# Patient Record
(demographics unavailable — no encounter records)

---

## 2025-04-24 NOTE — SOCIAL HISTORY
[TextEntry] : The patient is . They have one son and one daughter. He quit smoking at age 50. Prior to this, he smoked  pack daily from the age of 16. He denies any alcohol or drug use. He is retired from [employer].

## 2025-04-24 NOTE — PAST MEDICAL HISTORY
[TextEntry] : -  CAD. -  Hypertension. -  Dyslipidemia. -  Type II diabetes.  -  Mild to moderate MR.  -  Abnormal EKG. -  Bilateral cataracts. -  Anxiety. -  Reactive depression.

## 2025-04-24 NOTE — HISTORY OF PRESENT ILLNESS
[FreeTextEntry1] : Mr. Phil Hdz is a 72-year-old gentleman with a history of CAD, S/P CABGx4 with LIMA to LAD, SVG to D1, SVG to Ramus, SVG to LPL in 2006, S/P PTCI to SVG to D1 in 12/2017, hypertension, dyslipidemia, type II diabetes, CHF, who presents today for cardiovascular consultation. The patient presented to [facility] with an MI. His troponin peaked 2601, creatinine peaked at 1.73 and was slowly coming down. underwent cardiac catheterization at [facility] on 3/24/2025. This showed occluded SVG to D1, SVG to Ramus, SVG to LPL, with a widely patent LIMA to LAD. In addition, 80% stenosis of the ostial LAD, 90% stenosis of the LCx, 95% stenosis of the LPL, occluded Ramus, occluded D1. PTCI and FINN to LCx was to be performed, but since the creatinine dusty from 1.32 to 1.73, this was terminated. Echocardiogram on 3/25/2025 showed normal LV size and wall thickness, and an EF of 65%. The RV is normal in size with normal systolic function. No AS, no AI, no MS, mild to moderate MR, no TR and no PI was seen.  He was also treated for ?pneumonia versus pulmonary edema with azithromycin. The patient has been having dizziness, particularly when he lays down. He feels like the room is spinning. Per his son, the patient was found to have some fluid behind his ears. His son also notes that there is an insurance issue with [facility]. He denies recent chest pain, shortness of breath, palpitations, syncope, orthopnea, or PND. He has been taking his medications as prescribed.

## 2025-04-24 NOTE — DISCUSSION/SUMMARY
[Patient] : the patient [EKG obtained to assist in diagnosis and management of assessed problem(s)] : EKG obtained to assist in diagnosis and management of assessed problem(s) [FreeTextEntry2] : Son [FreeTextEntry3] : After catheterization. [FreeTextEntry1] : I have asked the patient undergo cardiac catheterization at [facility] for planned PTCI of the LCx. This will be scheduled with Dr. Moise Bryson at [facility]. The risks include but are not limited to 1:1000 death, 1:500 heart attack or stroke, 1:400 kidney damage, 1:300 damage at the artery insertion site of the radial or femoral artery, and 1:200 bleeding requiring blood transfusion. I have asked him to hold his Metformin two days prior to and the day of the procedure, as the Metformin and dye used with the catheterization can be harsh on the kidneys and cause lactic acidosis.  The patient's heart rate and blood pressure are well controlled. I have asked him to continue with his current medications as prescribed without change.  I have asked the patient to follow a low salt, low fat, low cholesterol diet. I have asked the patient not to engage in any new exercises or activities until after the cardiac work up is complete.  I have asked that the patient follow up with me following completion of his catheterization and stenting, or sooner with any change in symptoms.

## 2025-04-24 NOTE — ASSESSMENT
[FreeTextEntry1] : 1.  CAD, S/P CABG x4 in 2006, PTCI and FINN in 2017. 2.  S/P NSTEMI with peak troponins 2601 with cardiac cath on 3/23/2025 at [facility] showing severe 3-vessel disease with occluded SVG to D1, SVG to Ramus, SVG to LPL, with a widely patent LIMA to LAD. 3.  High dye load preventing intervention with increased Creatinine of 1.73. 4.  Outpatient intervention to LCx planned when patient returned to baseline. 5.  TARIQ. 6.  Hypertension.  7.  Dyslipidemia. 8.  Type II diabetes mellitus. 9.  Mild to moderate MR. 10.  Dizziness.

## 2025-04-24 NOTE — FAMILY HISTORY
[TextEntry] : The patient's mother passed away at age 80. She had osteoporosis. The patient's father passed away at age 90. He had a CVA in his 80s.

## 2025-04-24 NOTE — SURGICAL HISTORY
[TextEntry] : -  CABG x4 LIMA to LAD, SVG to D1, SVG to Ramus, SVG to LPL in 2006. -  S/P PTCI to SVG to D1 in 12/2017 -  Cataracts.

## 2025-04-24 NOTE — REVIEW OF SYSTEMS
[Dizziness] : dizziness [Fever] : no fever [Headache] : no headache [Weight Gain (___ Lbs)] : no recent weight gain [Chills] : no chills [Feeling Fatigued] : not feeling fatigued [Weight Loss (___ Lbs)] : no recent weight loss [Blurry Vision] : no blurred vision [Seeing Double (Diplopia)] : no diplopia [Eye Pain] : no eye pain [Earache] : no earache [Discharge From Ears] : no discharge from the ears [Hearing Loss] : no hearing loss [Mouth Sores] : no mouth sores [Sore Throat] : no sore throat [Sinus Pressure] : no sinus pressure [Tinnitus] : no tinnitus [Vertigo] : no vertigo [SOB] : no shortness of breath [Dyspnea on exertion] : not dyspnea during exertion [Chest Discomfort] : no chest discomfort [Lower Ext Edema] : no extremity edema [Leg Claudication] : no intermittent leg claudication [Palpitations] : no palpitations [Orthopnea] : no orthopnea [PND] : no PND [Syncope] : no syncope [Cough] : no cough [Wheezing] : no wheezing [Coughing Up Blood] : no hemoptysis [Snoring] : no snoring [Abdominal Pain] : no abdominal pain [Nausea] : no nausea [Vomiting] : no vomiting [Heartburn] : no heartburn [Change in Appetite] : no change in appetite [Change In The Stool] : no change in stool [Dysphagia] : no dysphagia [Diarrhea] : diarrhea [Constipation] : no constipation [Blood in stool] : no blood in stoo [Urinary Frequency] : no change in urinary frequency [Hematuria] : no hematuria [Pain During Urination] : no dysuria [Erectile Dysfunction] : no erectile dysfunction [Nocturia] : no nocturia [Joint Pain] : no joint pain [Joint Swelling] : no joint swelling [Joint Stiffness] : no joint stiffness [Muscle Cramps] : no muscle cramps [Myalgia] : no myalgia [Rash] : no rash [Itching] : no itching [Change In Color Of Skin] : change in skin color [Skin Lesions] : no skin lesions [Telangiectasias] : no telangiectasias [Tremor] : no tremor was seen [Numbness (Hypoesthesia)] : no numbness [Convulsions] : no convulsions [Tingling (Paresthesia)] : no tingling [Weakness] : no weakness [Limb Weakness (Paresis)] : no limb weakness (Paresis) [Speech Disturbance] : no speech disturbance [Confusion] : no confusion was observed [Memory Lapses Or Loss] : no memory lapses or loss [Depression] : no depression [Anxiety] : no anxiety [Under Stress] : not under stress [Suicidal] : not suicidal [Easy Bleeding] : no tendency for easy bleeding [Swollen Glands] : no swollen glands [Easy Bruising] : no tendency for easy bruising [FreeTextEntry2] : Feels like the room is spinning

## 2025-04-24 NOTE — CARDIOLOGY SUMMARY
[de-identified] : Normal sinus rhythm with a rate of 57 bpm, normal axis, normal [place] interval 158 ms, normal QRS duration 99 ms, normal QT interval 392 ms, with early R wave transition across the anterior precordium, inverted T waves in V5, V6, I, aVL, potentially consistent with anterolateral ischemia.

## 2025-05-29 NOTE — SURGICAL HISTORY
[TextEntry] : 1.  CABG x4 LIMA to LAD, SVG to D1, SVG to Ramus, SVG to LPL in 2006. 2.  S/P PTCI to SVG to D1 in 12/2017 3.  Cataracts. 4.  Status post PTCI and drug-eluting stent placement x 3 to the RCA on 5/14/2025

## 2025-05-29 NOTE — DISCUSSION/SUMMARY
[Patient] : the patient [FreeTextEntry2] : Son [FreeTextEntry3] : After catheterization. [FreeTextEntry1] : I spoke at length with Dr. Bryson regarding the remaining disease in the patient's circumflex coronary artery.  Intervention would be complex requiring both work on the left main mid and distal circumflex.  Collaterals are present and it is unclear if this is even necessary at this time.  We will monitor the patient's symptoms and have him undergo ischemic evaluation in approximately 12 weeks time with a Lexiscan Cardiolite stress test.  If no significant ischemia is noted then we will continue to manage the patient medically.  If he is symptomatic and showing significant ischemia, we will again discuss intervention to the left main and circumflex coronary artery.  The patient is complaining of a discomfort in his chest.  It may be related to inflammation and stretching of the coronary arteries or possibly small vessel angina.  The drop in blood pressure on isosorbide mononitrate precludes its use.  I have asked that he try Ranexa 500 mg p.o. twice daily.  He will continue to monitor his symptoms.  The patient's heart rate and blood pressure are well controlled. I have asked him to continue with his current medications as prescribed without change.  I have asked the patient to follow a low salt, low fat, low cholesterol diet. I have asked the patient to engage in regular exercises with a goal of 30 minutes 5 times weekly.  I have asked that the patient follow up with me in one month's time, or sooner with any change in symptoms.

## 2025-05-29 NOTE — CARDIOLOGY SUMMARY
[de-identified] : Normal sinus rhythm with a rate of 57 bpm, normal axis, normal [place] interval 158 ms, normal QRS duration 99 ms, normal QT interval 392 ms, with early R wave transition across the anterior precordium, inverted T waves in V5, V6, I, aVL, potentially consistent with anterolateral ischemia.   [de-identified] : 5/14/2025 Status post successful PTCI and drug-eluting stent placement x 3 to the RCA

## 2025-05-29 NOTE — PAST MEDICAL HISTORY
[TextEntry] : 1.  CAD. 2.  Hypertension. 3.  Dyslipidemia. 4.  Type II diabetes.  5.  Mild to moderate MR.  6.  Abnormal EKG. 7.  Bilateral cataracts. 8.  Anxiety. 9.  Reactive depression.

## 2025-05-29 NOTE — ASSESSMENT
[FreeTextEntry1] : 1.  CAD, S/P CABG x4 in 2006, PTCI and FINN in 2017. 2.  S/P NSTEMI with peak troponins 2601 with cardiac cath on 3/23/2025 at [facility] showing severe 3-vessel disease with occluded SVG to D1, SVG to Ramus, SVG to LPL, with a widely patent LIMA to LAD. 3.  High dye load preventing intervention with increased Creatinine of 1.73. 4.  S/P successful PTCI and FINN x3 to the RCA 5/14/25 5.  Persistent disease in the LM and LCx 6.  Hypertension.  7.  Dyslipidemia. 8.  Type II diabetes mellitus. 9.  Mild to moderate MR. 10.  TARIQ on CKD

## 2025-05-29 NOTE — REVIEW OF SYSTEMS
[Dizziness] : dizziness [Fever] : no fever [Headache] : no headache [Weight Gain (___ Lbs)] : no recent weight gain [Chills] : no chills [Feeling Fatigued] : not feeling fatigued [Weight Loss (___ Lbs)] : no recent weight loss [Blurry Vision] : no blurred vision [Seeing Double (Diplopia)] : no diplopia [Eye Pain] : no eye pain [Earache] : no earache [Discharge From Ears] : no discharge from the ears [Hearing Loss] : no hearing loss [Mouth Sores] : no mouth sores [Sore Throat] : no sore throat [Sinus Pressure] : no sinus pressure [Tinnitus] : no tinnitus [Vertigo] : no vertigo [SOB] : no shortness of breath [Dyspnea on exertion] : not dyspnea during exertion [Chest Discomfort] : chest discomfort [Lower Ext Edema] : no extremity edema [Leg Claudication] : no intermittent leg claudication [Palpitations] : no palpitations [Orthopnea] : no orthopnea [PND] : no PND [Syncope] : no syncope [Cough] : no cough [Wheezing] : no wheezing [Coughing Up Blood] : no hemoptysis [Snoring] : no snoring [Abdominal Pain] : no abdominal pain [Nausea] : no nausea [Vomiting] : no vomiting [Heartburn] : no heartburn [Change in Appetite] : no change in appetite [Change In The Stool] : no change in stool [Dysphagia] : no dysphagia [Diarrhea] : diarrhea [Constipation] : no constipation [Blood in stool] : no blood in stoo [Urinary Frequency] : no change in urinary frequency [Hematuria] : no hematuria [Pain During Urination] : no dysuria [Erectile Dysfunction] : no erectile dysfunction [Nocturia] : no nocturia [Joint Pain] : no joint pain [Joint Swelling] : no joint swelling [Joint Stiffness] : no joint stiffness [Muscle Cramps] : no muscle cramps [Myalgia] : no myalgia [Rash] : no rash [Itching] : no itching [Change In Color Of Skin] : change in skin color [Skin Lesions] : no skin lesions [Telangiectasias] : no telangiectasias [Tremor] : no tremor was seen [Numbness (Hypoesthesia)] : no numbness [Convulsions] : no convulsions [Tingling (Paresthesia)] : no tingling [Weakness] : no weakness [Limb Weakness (Paresis)] : no limb weakness (Paresis) [Speech Disturbance] : no speech disturbance [Confusion] : no confusion was observed [Memory Lapses Or Loss] : no memory lapses or loss [Depression] : no depression [Anxiety] : no anxiety [Under Stress] : not under stress [Suicidal] : not suicidal [Easy Bleeding] : no tendency for easy bleeding [Swollen Glands] : no swollen glands [Easy Bruising] : no tendency for easy bruising [FreeTextEntry2] : Feels like the room is spinning

## 2025-05-29 NOTE — HISTORY OF PRESENT ILLNESS
[FreeTextEntry1] : Mr. Phil Hdz is a 72-year-old gentleman with a history of CAD, S/P CABGx4 with LIMA to LAD, SVG to D1, SVG to Ramus, SVG to LPL in 2006, S/P PTCI to SVG to D1 in 12/2017, hypertension, dyslipidemia, type II diabetes, CHF, who presents today for cardiovascular consultation. The patient presented to [facility] with an MI. His troponin peaked 2601, creatinine peaked at 1.73 and was slowly coming down. underwent cardiac catheterization at [facility] on 3/24/2025. This showed occluded SVG to D1, SVG to Ramus, SVG to LPL, with a widely patent LIMA to LAD. In addition, 80% stenosis of the ostial LAD, 90% stenosis of the LCx, 95% stenosis of the LPL, occluded Ramus, occluded D1. PTCI and FINN to LCx was to be performed, but since the creatinine dusty from 1.32 to 1.73, this was terminated. Echocardiogram on 3/25/2025 showed normal LV size and wall thickness, and an EF of 65%. The RV is normal in size with normal systolic function. No AS, no AI, no MS, mild to moderate MR, no TR and no PI was seen.  The patient underwent successful PTCI and drug-eluting stent placement x 3 to the RCA on 5/14/2025.  LIMA to the LAD was widely patent and the patient did have significant stenosis in the distal left main and circumflex.  With some collateral circulation, the circumflex was left due to the complexity of the intervention.  The patient notes that since the procedure, he has been having some pressure in his chest.  He was seen by Dr. Bryson's PA and was started on isosorbide mononitrate 30 mg daily.  The patient had his son notes that he was having significant drops in his blood pressure when taking this medication.  They tried 1/2 tablet and unfortunately, he was having similar difficulties.  The patient was hypotensive and symptomatic.  He notes that his right groin is healing well.  He is tolerating the remainder of his medications without change.  He notes some weakness in his left leg post procedure.  He denies any syncope, orthopnea, or PND.

## 2025-06-26 NOTE — DISCUSSION/SUMMARY
[___ Week(s)] : in [unfilled] week(s) [FreeTextEntry2] : Son [FreeTextEntry1] : I have asked the patient to complete a Lexiscan Cardiolite stress test in about 8 weeks to assess myocardial perfusion post-revascularization.  If no significant ischemia is noted, then we will continue to manage the patient medically.  If he is symptomatic and showing significant ischemia, we will again discuss intervention to the left main and circumflex coronary artery.  The patient's heart rate and blood pressure are well controlled. I have asked him to continue with his current medications as prescribed without change. He does not need to resume the Ranexa, as this was being used to treat symptoms, and made him feel worse.  I have asked the patient to follow a low salt, low fat, low cholesterol diet. I have asked the patient to engage in regular exercises with a goal of 30 minutes 5 times weekly.  From a cardiac standpoint, he no longer requires his supplemental oxygen.   I have asked that the patient follow up with me in eight weeks' time, or sooner with any change in symptoms.  I, Lillian Kong, am scribing for and the presence of Dr. Juve Sanders, the following sections HISTORY OF PRESENT ILLNESSS; CARDIOLOGY SUMMARY; ACTIVE PROBLEMS; PAST MEDICAL HISTORY; PAST SURGICAL HISTORY; FAMILY HISTORY; SOCIAL HISTORY; REVIEW OF SYSTEMS; PHYSICAL EXAM; ASSESSMENT; PLAN.

## 2025-06-26 NOTE — CARDIOLOGY SUMMARY
[de-identified] : From 5/20/2025: Normal sinus rhythm with a rate of 57 bpm, normal axis, normal SD interval 158 ms, normal QRS duration 99 ms, normal QT interval 392 ms, with early R wave transition across the anterior precordium, inverted T waves in V5, V6, I, aVL, potentially consistent with anterolateral ischemia.   [de-identified] : Echocardiogram on 3/25/2025 showed normal LV size and wall thickness, and an EF of 65%. The RV is normal in size with normal systolic function. No AS, no AI, no MS, mild to moderate MR, no TR and no PI was seen. [de-identified] : 5/14/2025: Status post successful PTCI and drug-eluting stent placement x 3 to the RCA. Persistent disease in the LM and LCx  Cath @ [facility] on 3/24/2025: This showed occluded SVG to D1, SVG to Ramus, SVG to LPL, with a widely patent LIMA to LAD. In addition, 80% stenosis of the ostial LAD, 90% stenosis of the LCx, 95% stenosis of the LPL, occluded Ramus, occluded D1.

## 2025-06-26 NOTE — HISTORY OF PRESENT ILLNESS
[FreeTextEntry1] : Mr. Phil Hdz is a 72-year-old gentleman with a history of CAD, S/P CABGx4 with LIMA to LAD, SVG to D1, SVG to Ramus, SVG to LPL in 2006, S/P PTCI to SVG to D1 in 12/2017, S/P NSTEMI with peak troponins 2601 with cardiac cath on 3/23/2025 at [facility], S/P successful PTCI and FINN x3 to the RCA 5/14/25, persistent disease in the LM and LCx by 5/14/25 cath, hypertension, dyslipidemia, type II diabetes, CHF, mild to moderate MR, who presents today for follow up. The patient has overall been feeling well. He has a bit of dizziness, but his has improved. He is now able to walk straight. He stopped his Ranexa 2 days ago, as he had vomited several times and had diarrhea since starting this. His son states he is no longer using his oxygen, and is asking if he can remain off of it. He denies any recent chest pain, shortness of breath, syncope, orthopnea, or PND.

## 2025-06-26 NOTE — REVIEW OF SYSTEMS
[Nausea] : nausea [Vomiting] : vomiting [Diarrhea] : diarrhea [Dizziness] : dizziness [Fever] : no fever [Headache] : no headache [Weight Gain (___ Lbs)] : no recent weight gain [Chills] : no chills [Feeling Fatigued] : not feeling fatigued [Weight Loss (___ Lbs)] : no recent weight loss [Blurry Vision] : no blurred vision [Seeing Double (Diplopia)] : no diplopia [Eye Pain] : no eye pain [Earache] : no earache [Discharge From Ears] : no discharge from the ears [Hearing Loss] : no hearing loss [Mouth Sores] : no mouth sores [Sore Throat] : no sore throat [Sinus Pressure] : no sinus pressure [Tinnitus] : no tinnitus [Vertigo] : no vertigo [SOB] : no shortness of breath [Dyspnea on exertion] : not dyspnea during exertion [Chest Discomfort] : no chest discomfort [Lower Ext Edema] : no extremity edema [Leg Claudication] : no intermittent leg claudication [Palpitations] : no palpitations [Orthopnea] : no orthopnea [PND] : no PND [Syncope] : no syncope [Cough] : no cough [Wheezing] : no wheezing [Coughing Up Blood] : no hemoptysis [Snoring] : no snoring [Abdominal Pain] : no abdominal pain [Heartburn] : no heartburn [Change in Appetite] : no change in appetite [Change In The Stool] : no change in stool [Dysphagia] : no dysphagia [Constipation] : no constipation [Blood in stool] : no blood in stoo [Urinary Frequency] : no change in urinary frequency [Hematuria] : no hematuria [Pain During Urination] : no dysuria [Erectile Dysfunction] : no erectile dysfunction [Nocturia] : no nocturia [Joint Pain] : no joint pain [Joint Swelling] : no joint swelling [Joint Stiffness] : no joint stiffness [Muscle Cramps] : no muscle cramps [Myalgia] : no myalgia [Rash] : no rash [Itching] : no itching [Change In Color Of Skin] : change in skin color [Skin Lesions] : no skin lesions [Telangiectasias] : no telangiectasias [Tremor] : no tremor was seen [Numbness (Hypoesthesia)] : no numbness [Convulsions] : no convulsions [Tingling (Paresthesia)] : no tingling [Weakness] : no weakness [Limb Weakness (Paresis)] : no limb weakness (Paresis) [Speech Disturbance] : no speech disturbance [Confusion] : no confusion was observed [Memory Lapses Or Loss] : no memory lapses or loss [Depression] : no depression [Anxiety] : no anxiety [Under Stress] : not under stress [Suicidal] : not suicidal [Easy Bleeding] : no tendency for easy bleeding [Swollen Glands] : no swollen glands [Easy Bruising] : no tendency for easy bruising [FreeTextEntry2] : Feels like the room is spinning

## 2025-07-18 NOTE — ASSESSMENT
[FreeTextEntry1] : 1.  CAD, S/P CABG x4 in 2006, PTCI and FINN in 2017. 2.  S/P NSTEMI with peak troponins 2601 with cardiac cath on 3/23/2025 at [facility] showing severe 3-vessel disease with occluded SVG to D1, SVG to Ramus, SVG to LPL, with a widely patent LIMA to LAD. 3.  High dye load preventing intervention with increased Creatinine of 1.73. 4.  S/P successful PTCI and FINN x3 to the RCA 5/14/25 5.  Persistent disease in the LM and LCx 6.  Hypertension.  7.  Dyslipidemia. 8.  Type II diabetes mellitus. 9.  Mild to moderate MR. 10.  TARIQ on CKD 11.  Intolerance of Ranexa.  
86